# Patient Record
Sex: FEMALE | Race: BLACK OR AFRICAN AMERICAN | NOT HISPANIC OR LATINO | URBAN - METROPOLITAN AREA
[De-identification: names, ages, dates, MRNs, and addresses within clinical notes are randomized per-mention and may not be internally consistent; named-entity substitution may affect disease eponyms.]

---

## 2018-12-17 ENCOUNTER — WALK IN (OUTPATIENT)
Dept: URGENT CARE | Age: 28
End: 2018-12-17

## 2018-12-17 DIAGNOSIS — Z11.1 SCREENING-PULMONARY TB: Primary | ICD-10-CM

## 2018-12-17 PROCEDURE — 86580 TB INTRADERMAL TEST: CPT | Performed by: REGISTERED NURSE

## 2018-12-19 ENCOUNTER — WALK IN (OUTPATIENT)
Dept: URGENT CARE | Age: 28
End: 2018-12-19

## 2018-12-19 DIAGNOSIS — Z11.1 PPD SCREENING TEST: Primary | ICD-10-CM

## 2018-12-19 LAB
INDURATION: 0 MM (ref 0–?)
SKIN TEST RESULT: NORMAL

## 2018-12-19 PROCEDURE — X1094 NO CHARGE VISIT: HCPCS | Performed by: NURSE PRACTITIONER

## 2019-04-05 ENCOUNTER — WALK IN (OUTPATIENT)
Dept: URGENT CARE | Age: 29
End: 2019-04-05

## 2019-04-05 DIAGNOSIS — Z11.1 SCREENING-PULMONARY TB: Primary | ICD-10-CM

## 2019-04-05 PROCEDURE — 86580 TB INTRADERMAL TEST: CPT | Performed by: NURSE PRACTITIONER

## 2019-04-07 ENCOUNTER — WALK IN (OUTPATIENT)
Dept: URGENT CARE | Age: 29
End: 2019-04-07

## 2019-04-07 DIAGNOSIS — Z11.1 ENCOUNTER FOR PPD SKIN TEST READING: Primary | ICD-10-CM

## 2019-04-07 LAB
INDURATION: 0 MM (ref 0–?)
SKIN TEST RESULT: NEGATIVE

## 2019-04-07 PROCEDURE — X1094 NO CHARGE VISIT: HCPCS | Performed by: NURSE PRACTITIONER

## 2019-04-12 ENCOUNTER — WALK IN (OUTPATIENT)
Dept: URGENT CARE | Age: 29
End: 2019-04-12

## 2019-04-12 DIAGNOSIS — Z11.1 SCREENING-PULMONARY TB: Primary | ICD-10-CM

## 2019-04-12 PROCEDURE — 86580 TB INTRADERMAL TEST: CPT | Performed by: NURSE PRACTITIONER

## 2019-04-14 ENCOUNTER — WALK IN (OUTPATIENT)
Dept: URGENT CARE | Age: 29
End: 2019-04-14

## 2019-04-14 DIAGNOSIS — Z11.1 ENCOUNTER FOR PPD SKIN TEST READING: Primary | ICD-10-CM

## 2019-04-14 LAB
INDURATION: 0 MM (ref 0–?)
SKIN TEST RESULT: NEGATIVE

## 2019-04-14 PROCEDURE — X1094 NO CHARGE VISIT: HCPCS | Performed by: NURSE PRACTITIONER

## 2020-02-14 ENCOUNTER — WORKER'S COMP (OUTPATIENT)
Dept: OCCUPATIONAL MEDICINE | Age: 30
End: 2020-02-14

## 2020-02-14 DIAGNOSIS — Z02.71 ISSUE OF MEDICAL CERTIFICATE FOR DISABILITY EXAMINATION: ICD-10-CM

## 2020-02-14 DIAGNOSIS — S39.012A LUMBOSACRAL STRAIN, INITIAL ENCOUNTER: Primary | ICD-10-CM

## 2020-02-14 PROCEDURE — 99204 OFFICE O/P NEW MOD 45 MIN: CPT | Performed by: FAMILY MEDICINE

## 2020-02-19 ENCOUNTER — WORKER'S COMP (OUTPATIENT)
Dept: OCCUPATIONAL MEDICINE | Age: 30
End: 2020-02-19

## 2020-02-19 DIAGNOSIS — Z02.71 ISSUE OF MEDICAL CERTIFICATE FOR DISABILITY EXAMINATION: ICD-10-CM

## 2020-02-19 DIAGNOSIS — S39.012D LUMBOSACRAL STRAIN, SUBSEQUENT ENCOUNTER: Primary | ICD-10-CM

## 2020-02-19 PROCEDURE — 99214 OFFICE O/P EST MOD 30 MIN: CPT | Performed by: FAMILY MEDICINE

## 2020-02-19 ASSESSMENT — PAIN SCALES - GENERAL: PAINLEVEL: 7-8

## 2020-02-27 ENCOUNTER — TELEPHONE (OUTPATIENT)
Dept: OCCUPATIONAL MEDICINE | Age: 30
End: 2020-02-27

## 2020-03-04 ENCOUNTER — WORKER'S COMP (OUTPATIENT)
Dept: OCCUPATIONAL MEDICINE | Age: 30
End: 2020-03-04

## 2020-03-04 DIAGNOSIS — S39.012D LUMBOSACRAL STRAIN, SUBSEQUENT ENCOUNTER: Primary | ICD-10-CM

## 2020-03-04 DIAGNOSIS — Z02.71 ISSUE OF MEDICAL CERTIFICATE FOR DISABILITY EXAMINATION: ICD-10-CM

## 2020-03-04 PROCEDURE — 99214 OFFICE O/P EST MOD 30 MIN: CPT | Performed by: FAMILY MEDICINE

## 2020-03-04 ASSESSMENT — PAIN SCALES - GENERAL: PAINLEVEL: 0

## 2020-03-17 ENCOUNTER — APPOINTMENT (OUTPATIENT)
Dept: PHYSICAL THERAPY | Age: 30
End: 2020-03-17
Attending: FAMILY MEDICINE

## 2020-03-18 ENCOUNTER — APPOINTMENT (OUTPATIENT)
Dept: OCCUPATIONAL MEDICINE | Age: 30
End: 2020-03-18

## 2020-03-25 ENCOUNTER — APPOINTMENT (OUTPATIENT)
Dept: OCCUPATIONAL MEDICINE | Age: 30
End: 2020-03-25

## 2020-05-15 ENCOUNTER — WORKER'S COMP (OUTPATIENT)
Dept: OCCUPATIONAL MEDICINE | Age: 30
End: 2020-05-15

## 2020-05-15 DIAGNOSIS — S39.012D LUMBOSACRAL STRAIN, SUBSEQUENT ENCOUNTER: ICD-10-CM

## 2020-05-15 DIAGNOSIS — Z02.71 ISSUE OF MEDICAL CERTIFICATE FOR DISABILITY EXAMINATION: Primary | ICD-10-CM

## 2020-05-15 PROCEDURE — 99441 TELEPHONE E&M BY PHYSICIAN EST PT NOT ORIG PREV 7 DAYS 5-10 MIN: CPT | Performed by: FAMILY MEDICINE

## 2020-08-12 ENCOUNTER — OFFICE VISIT (OUTPATIENT)
Dept: OTHER | Facility: HOSPITAL | Age: 30
End: 2020-08-12
Attending: PREVENTIVE MEDICINE

## 2020-08-12 DIAGNOSIS — Z01.84 IMMUNITY STATUS TESTING: ICD-10-CM

## 2020-08-12 DIAGNOSIS — Z11.1 SCREENING-PULMONARY TB: Primary | ICD-10-CM

## 2020-08-12 PROCEDURE — 86787 VARICELLA-ZOSTER ANTIBODY: CPT

## 2020-08-12 PROCEDURE — 86480 TB TEST CELL IMMUN MEASURE: CPT

## 2020-08-13 LAB
M TB IFN-G CD4+ T-CELLS BLD-ACNC: 0.06 IU/ML
M TB TUBERC IFN-G BLD QL: NEGATIVE
M TB TUBERC IGNF/MITOGEN IGNF CONTROL: 5.98 IU/ML
QUANTIFERON TB1 MINUS NIL: 0 IU/ML
QUANTIFERON TB2 MINUS NIL: 0 IU/ML

## 2020-08-14 LAB — VZV IGG SER IA-ACNC: >4000 (ref 165–?)

## 2021-01-01 ENCOUNTER — TELEPHONE (OUTPATIENT)
Dept: INTERNAL MEDICINE CLINIC | Facility: HOSPITAL | Age: 31
End: 2021-01-01

## 2021-01-01 DIAGNOSIS — Z20.822 SUSPECTED COVID-19 VIRUS INFECTION: Primary | ICD-10-CM

## 2021-01-01 NOTE — TELEPHONE ENCOUNTER
Department: CTU 3                                 [x] [address] 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     HAVE []   No [x]    If yes, who: Do you share a workspace? Yes []   No [x]       If yes, with whom? Do you have any family members sick at home?      [] Yes    [x] No   If yes, explain:       NOTES: States her symptoms began with a headache back on 12/23/20 an

## 2021-01-02 ENCOUNTER — LAB ENCOUNTER (OUTPATIENT)
Dept: LAB | Age: 31
End: 2021-01-02
Attending: PREVENTIVE MEDICINE

## 2021-01-02 DIAGNOSIS — Z20.822 SUSPECTED COVID-19 VIRUS INFECTION: ICD-10-CM

## 2021-01-03 LAB — SARS-COV-2 RNA RESP QL NAA+PROBE: NOT DETECTED

## 2021-04-07 ENCOUNTER — LAB ENCOUNTER (OUTPATIENT)
Dept: LAB | Age: 31
End: 2021-04-07
Attending: PREVENTIVE MEDICINE

## 2021-04-07 ENCOUNTER — TELEPHONE (OUTPATIENT)
Dept: INTERNAL MEDICINE CLINIC | Facility: HOSPITAL | Age: 31
End: 2021-04-07

## 2021-04-07 DIAGNOSIS — Z20.822 SUSPECTED COVID-19 VIRUS INFECTION: Primary | ICD-10-CM

## 2021-04-07 DIAGNOSIS — Z20.822 SUSPECTED COVID-19 VIRUS INFECTION: ICD-10-CM

## 2021-04-07 NOTE — TELEPHONE ENCOUNTER
Results and RTW guidelines:    COVID RESULT GIVEN:      Test type:    [x] Rapid         [] Alinity      [x] NEGATIVE     Ordered Alinity retest?  []Yes   [x] No    Notes: Will see PCP for evaluation and Tx.     RTW PLAN:    [] RTW 10 days with clearance []       • Fatigue                        Yes []       • Body Aches                Yes []        • Chills                           Yes []        • Headache                   Yes []             • GI symptoms             Yes []     No [x] hotline. [] Asymptomatic: Test w/ Alinity now; repeat w/ Alinity in 5-7 days. Ok to work, monitor for symptoms     [] High-risk exposure:    [] Symptomatic: Test w/ Rapid now; repeat w/ Alinity in 1-2 days if persistent symptoms or high suspicion.   Home

## 2021-05-25 VITALS
TEMPERATURE: 98.3 F | TEMPERATURE: 98.9 F | HEART RATE: 98 BPM | DIASTOLIC BLOOD PRESSURE: 79 MMHG | SYSTOLIC BLOOD PRESSURE: 123 MMHG | HEART RATE: 83 BPM | DIASTOLIC BLOOD PRESSURE: 81 MMHG | SYSTOLIC BLOOD PRESSURE: 115 MMHG

## 2021-11-02 ENCOUNTER — LAB ENCOUNTER (OUTPATIENT)
Dept: LAB | Facility: HOSPITAL | Age: 31
End: 2021-11-02
Attending: PREVENTIVE MEDICINE

## 2021-11-02 ENCOUNTER — TELEPHONE (OUTPATIENT)
Dept: INTERNAL MEDICINE CLINIC | Facility: HOSPITAL | Age: 31
End: 2021-11-02

## 2021-11-02 DIAGNOSIS — Z20.822 SUSPECTED COVID-19 VIRUS INFECTION: Primary | ICD-10-CM

## 2021-11-02 DIAGNOSIS — Z20.822 SUSPECTED COVID-19 VIRUS INFECTION: ICD-10-CM

## 2021-11-02 NOTE — TELEPHONE ENCOUNTER
Department: CTU 3         [x] [facility]  []MISTI   [] [address] [place] [address]    Dept Manager/Supervisor/team or clinical lead: Britty Brunner  Position:  [] MD     [] RN     [] Respiratory Therapist     [x] PCT     [] PSR      [] Other     HAVE YOU RECEIVED THE COVID-19 Vaccine?  Enedina Marinelli your unit while not wearing a mask? (e.g., during meal breaks):  Yes []   No [x]    If yes, who:   Do you share a workspace? Yes []   No [x]       If yes, with whom? Do you have any family members sick at home?      [] Yes    [x] No   If yes, explain: taken:    11/2/2021  []  Outside testing       [x] Manager notified    INSTRUCTIONS PROVIDED:    [x] Employee will schedule testing via [employer] or call Central Scheduling at [phone].   Instructed to remain in their vehicle when arrive at site; call Reg

## 2021-11-03 ENCOUNTER — LAB ENCOUNTER (OUTPATIENT)
Dept: LAB | Facility: HOSPITAL | Age: 31
End: 2021-11-03
Attending: PREVENTIVE MEDICINE

## 2021-11-03 DIAGNOSIS — Z20.822 SUSPECTED COVID-19 VIRUS INFECTION: ICD-10-CM

## 2021-11-03 NOTE — TELEPHONE ENCOUNTER
Results and RTW guidelines:    COVID RESULT:    [] Viewed by employee in [address] RTW plan and instructions as indicated on triage call. Manager notified. Estimated RTW date:   [x] Discussed with employee   [] Unable to reach by phone.   Sent via [employer] and if symptoms worsen                - If outside testing completed, bring a copy of result to RTW appointment      Notes:   Rapid negative. Still with congestion and loss of taste today. Went to work. Will get Alinity today.     RTW PLAN:    [] RTW 10 day

## 2021-12-23 ENCOUNTER — TELEPHONE (OUTPATIENT)
Dept: INTERNAL MEDICINE CLINIC | Facility: HOSPITAL | Age: 31
End: 2021-12-23

## 2021-12-23 ENCOUNTER — LAB ENCOUNTER (OUTPATIENT)
Dept: LAB | Age: 31
End: 2021-12-23
Attending: PREVENTIVE MEDICINE

## 2021-12-23 DIAGNOSIS — Z20.822 EXPOSURE TO CONFIRMED CASE OF COVID-19: ICD-10-CM

## 2021-12-23 DIAGNOSIS — Z20.822 EXPOSURE TO CONFIRMED CASE OF COVID-19: Primary | ICD-10-CM

## 2021-12-23 NOTE — TELEPHONE ENCOUNTER
Outside Covid Testing done    Results and RTW guidelines:    COVID RESULT reported:      Test type:    [x] Rapid outside         [] PCR outside    Date of test: 12/22/2021     Test location: [facility]         [] Result viewed in Epic with verbal co hospitalization for COVID    4. Within 20 days of intubation for COVID    5.  Still has a fever, vomiting or diarrhea   - Keep communication open with management about RTW and if symptoms worsen     Notes:     RTW PLAN:    [] RTW 10 days with clearance from Yes [x] 100.0;  Taking medication          • Cough                          Yes [x]      • Shortness of breath  Yes []      • Congestion                 Yes []      • Runny nose                Yes []        • Loss of Smell              Yes []       •  L

## 2021-12-25 NOTE — TELEPHONE ENCOUNTER
----- Message from Leo Grove MD sent at 12/25/2021  8:50 AM CST -----  Covid - DETECTED results to covid center    Results and RTW guidelines:    COVID RESULT:    [] Viewed by employee in [address]   RTW plan and instructions as indicated on t activities    2. Is still feeling \"unwell\"    3. Within 15 days of hospitalization for COVID    4. Within 20 days of intubation for COVID    5.  Still has a fever, vomiting or diarrhea   - Keep communication open with management about RTW and if symptoms

## 2022-06-04 ENCOUNTER — HOSPITAL ENCOUNTER (EMERGENCY)
Facility: HOSPITAL | Age: 32
Discharge: HOME | End: 2022-06-04
Attending: EMERGENCY MEDICINE
Payer: MEDICAID

## 2022-06-04 VITALS — HEART RATE: 95 BPM | TEMPERATURE: 99 F | WEIGHT: 240 LBS | OXYGEN SATURATION: 100 % | RESPIRATION RATE: 16 BRPM

## 2022-06-04 DIAGNOSIS — S29.012A STRAIN OF THORACIC SPINE: Primary | ICD-10-CM

## 2022-06-04 LAB
ATRIAL RATE: 88 BPM
BILIRUB UR QL STRIP.AUTO: NEGATIVE
COLOR UR AUTO: YELLOW
GLUCOSE UR STRIP.AUTO-MCNC: NEGATIVE MG/DL
KETONES UR STRIP.AUTO-MCNC: NEGATIVE MG/DL
NITRITE UR QL STRIP.AUTO: NEGATIVE
P AXIS: 70 DEGREES
P-R INTERVAL: 140 MS
PH UR STRIP.AUTO: 6 [PH] (ref 5–8)
Q-T INTERVAL: 340 MS
QRS DURATION: 80 MS
QTC CALCULATION (BEZET): 411 MS
R AXIS: 66 DEGREES
RBC UR QL AUTO: NEGATIVE
SP GR UR STRIP.AUTO: >=1.03 (ref 1–1.03)
T AXIS: 39 DEGREES
UROBILINOGEN UR STRIP.AUTO-MCNC: 0.2 MG/DL
VENTRICULAR RATE: 88 BPM

## 2022-06-04 PROCEDURE — 93010 ELECTROCARDIOGRAM REPORT: CPT

## 2022-06-04 PROCEDURE — 81015 MICROSCOPIC EXAM OF URINE: CPT | Performed by: EMERGENCY MEDICINE

## 2022-06-04 PROCEDURE — 81001 URINALYSIS AUTO W/SCOPE: CPT | Performed by: EMERGENCY MEDICINE

## 2022-06-04 PROCEDURE — 87086 URINE CULTURE/COLONY COUNT: CPT | Performed by: EMERGENCY MEDICINE

## 2022-06-04 PROCEDURE — 93005 ELECTROCARDIOGRAM TRACING: CPT

## 2022-06-04 PROCEDURE — 99284 EMERGENCY DEPT VISIT MOD MDM: CPT

## 2022-06-04 RX ORDER — CYCLOBENZAPRINE HCL 10 MG
10 TABLET ORAL ONCE
Status: COMPLETED | OUTPATIENT
Start: 2022-06-04 | End: 2022-06-04

## 2022-06-04 NOTE — ED INITIAL ASSESSMENT (HPI)
Back pain for a few days without any known injury. Patient denies GI or  issues. Motrin gave minimal relief.

## 2022-06-04 NOTE — CM/SW NOTE
Called in flexeril 10mg TID PRN, total of 15 tabs. Confirmed this with Dr. Jd Tony. Called patient to inform her of this as well.

## 2023-12-16 ENCOUNTER — APPOINTMENT (OUTPATIENT)
Dept: GENERAL RADIOLOGY | Facility: HOSPITAL | Age: 33
End: 2023-12-16
Attending: EMERGENCY MEDICINE
Payer: OTHER MISCELLANEOUS

## 2023-12-16 ENCOUNTER — HOSPITAL ENCOUNTER (EMERGENCY)
Facility: HOSPITAL | Age: 33
Discharge: HOME | End: 2023-12-16
Attending: EMERGENCY MEDICINE
Payer: OTHER MISCELLANEOUS

## 2023-12-16 VITALS
DIASTOLIC BLOOD PRESSURE: 63 MMHG | OXYGEN SATURATION: 100 % | RESPIRATION RATE: 16 BRPM | HEIGHT: 66 IN | BODY MASS INDEX: 37.77 KG/M2 | WEIGHT: 235 LBS | HEART RATE: 71 BPM | SYSTOLIC BLOOD PRESSURE: 119 MMHG | TEMPERATURE: 98 F
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
## 2023-12-16 DIAGNOSIS — S20.221A CONTUSION OF RIGHT SIDE OF BACK, INITIAL ENCOUNTER: ICD-10-CM

## 2023-12-16 DIAGNOSIS — S09.90XA CLOSED HEAD INJURY, INITIAL ENCOUNTER: Primary | ICD-10-CM

## 2023-12-16 DIAGNOSIS — S93.401A SPRAIN OF RIGHT ANKLE, UNSPECIFIED LIGAMENT, INITIAL ENCOUNTER: ICD-10-CM

## 2023-12-16 PROCEDURE — 96374 THER/PROPH/DIAG INJ IV PUSH: CPT

## 2023-12-16 PROCEDURE — 99284 EMERGENCY DEPT VISIT MOD MDM: CPT

## 2023-12-16 PROCEDURE — 72170 X-RAY EXAM OF PELVIS: CPT | Performed by: EMERGENCY MEDICINE

## 2023-12-16 PROCEDURE — 96375 TX/PRO/DX INJ NEW DRUG ADDON: CPT

## 2023-12-16 PROCEDURE — 73610 X-RAY EXAM OF ANKLE: CPT | Performed by: EMERGENCY MEDICINE

## 2023-12-16 PROCEDURE — 72100 X-RAY EXAM L-S SPINE 2/3 VWS: CPT | Performed by: EMERGENCY MEDICINE

## 2023-12-16 RX ORDER — KETOROLAC TROMETHAMINE 30 MG/ML
15 INJECTION, SOLUTION INTRAMUSCULAR; INTRAVENOUS ONCE
Status: COMPLETED | OUTPATIENT
Start: 2023-12-16 | End: 2023-12-16

## 2023-12-16 RX ORDER — TIZANIDINE 4 MG/1
4 TABLET ORAL EVERY 6 HOURS PRN
Qty: 30 TABLET | Refills: 0 | Status: SHIPPED | OUTPATIENT
Start: 2023-12-16

## 2023-12-16 RX ORDER — HYDROCODONE BITARTRATE AND ACETAMINOPHEN 5; 325 MG/1; MG/1
1-2 TABLET ORAL EVERY 6 HOURS PRN
Qty: 10 TABLET | Refills: 0 | Status: SHIPPED | OUTPATIENT
Start: 2023-12-16 | End: 2023-12-21

## 2023-12-16 RX ORDER — HYDROMORPHONE HYDROCHLORIDE 1 MG/ML
0.5 INJECTION, SOLUTION INTRAMUSCULAR; INTRAVENOUS; SUBCUTANEOUS EVERY 30 MIN PRN
Status: DISCONTINUED | OUTPATIENT
Start: 2023-12-16 | End: 2023-12-16

## 2023-12-16 RX ORDER — ONDANSETRON 2 MG/ML
4 INJECTION INTRAMUSCULAR; INTRAVENOUS ONCE
Status: COMPLETED | OUTPATIENT
Start: 2023-12-16 | End: 2023-12-16

## 2023-12-16 NOTE — ED INITIAL ASSESSMENT (HPI)
PT TO THE ED WITH C/O FALL. PT WAS TAKING A TRAY TO A ROOM WHEN SHE SLIPPED ON WATER ON THE FLOOR. PT STATES SHE HIT THE RIGHT SIDE OF HER HEAD, C/O LOWER BACK PAIN AND RIGHT ANKLE PAIN.  PT DENIES LOC AND BLOOD THINNERS

## 2023-12-17 NOTE — DISCHARGE INSTRUCTIONS
[address] [place] [address]  Dept: [phone]  Dept Fax: [phone]     Occupational restrictions  For: Alice Kamran    No work until reassessed by [employer] on Monday. You may take ibuprofen 600 to 800 mg every 6 hours as needed for pain. Take with food. Right leg weightbearing as tolerated with crutches. Ice intermittently, 20 minutes on and 20 minutes off.

## 2023-12-19 ENCOUNTER — APPOINTMENT (OUTPATIENT)
Dept: OTHER | Facility: HOSPITAL | Age: 33
End: 2023-12-19
Attending: PREVENTIVE MEDICINE

## 2023-12-23 ENCOUNTER — OFFICE VISIT (OUTPATIENT)
Dept: OCCUPATIONAL MEDICINE | Age: 33
End: 2023-12-23
Attending: FAMILY MEDICINE
Payer: OTHER MISCELLANEOUS

## 2023-12-23 ENCOUNTER — HOSPITAL ENCOUNTER (OUTPATIENT)
Dept: CT IMAGING | Age: 33
Discharge: HOME | End: 2023-12-23
Attending: FAMILY MEDICINE
Payer: OTHER MISCELLANEOUS

## 2023-12-23 DIAGNOSIS — S09.90XA HEAD TRAUMA: ICD-10-CM

## 2023-12-23 DIAGNOSIS — S09.90XA HEAD TRAUMA: Primary | ICD-10-CM

## 2023-12-23 PROCEDURE — 70450 CT HEAD/BRAIN W/O DYE: CPT | Performed by: FAMILY MEDICINE

## 2023-12-28 ENCOUNTER — HOSPITAL ENCOUNTER (OUTPATIENT)
Dept: GENERAL RADIOLOGY | Facility: HOSPITAL | Age: 33
Discharge: HOME | End: 2023-12-28
Attending: PREVENTIVE MEDICINE

## 2023-12-28 ENCOUNTER — OFFICE VISIT (OUTPATIENT)
Dept: OTHER | Facility: HOSPITAL | Age: 33
End: 2023-12-28
Attending: PREVENTIVE MEDICINE

## 2023-12-28 DIAGNOSIS — S83.91XA RIGHT KNEE SPRAIN: ICD-10-CM

## 2023-12-28 DIAGNOSIS — S83.91XA RIGHT KNEE SPRAIN: Primary | ICD-10-CM

## 2023-12-28 PROCEDURE — 73562 X-RAY EXAM OF KNEE 3: CPT | Performed by: PREVENTIVE MEDICINE

## 2024-01-02 ENCOUNTER — APPOINTMENT (OUTPATIENT)
Dept: OTHER | Facility: HOSPITAL | Age: 34
End: 2024-01-02
Attending: PREVENTIVE MEDICINE

## 2024-01-05 ENCOUNTER — TELEPHONE (OUTPATIENT)
Dept: ORTHOPEDICS CLINIC | Facility: CLINIC | Age: 34
End: 2024-01-05

## 2024-01-05 NOTE — TELEPHONE ENCOUNTER
Patient scheduled with Dr. Luna for a right knee work injury. Imaging in epic.    Future Appointments   Date Time Provider Department Center   1/12/2024 11:30 AM Foster Luna,  [facility]   1/16/2024 12:00 PM Keerthi Riley MD [facility]

## 2024-01-12 ENCOUNTER — OFFICE VISIT (OUTPATIENT)
Dept: ORTHOPEDICS CLINIC | Facility: CLINIC | Age: 34
End: 2024-01-12
Payer: OTHER MISCELLANEOUS

## 2024-01-12 VITALS — WEIGHT: 235 LBS | BODY MASS INDEX: 37.77 KG/M2 | HEIGHT: 66 IN

## 2024-01-12 DIAGNOSIS — M23.91 DERANGEMENT OF RIGHT KNEE: Primary | ICD-10-CM

## 2024-01-12 DIAGNOSIS — S83.206A POSITIVE MCMURRAY TEST OF RIGHT KNEE, INITIAL ENCOUNTER: ICD-10-CM

## 2024-01-12 PROCEDURE — 99204 OFFICE O/P NEW MOD 45 MIN: CPT | Performed by: FAMILY MEDICINE

## 2024-01-12 PROCEDURE — 3008F BODY MASS INDEX DOCD: CPT | Performed by: FAMILY MEDICINE

## 2024-01-12 RX ORDER — DIAZEPAM 5 MG/1
5 TABLET ORAL
Qty: 2 TABLET | Refills: 0 | Status: SHIPPED | OUTPATIENT
Start: 2024-01-12 | End: 2024-01-12

## 2024-01-12 RX ORDER — DICLOFENAC SODIUM 75 MG/1
75 TABLET, DELAYED RELEASE ORAL 2 TIMES DAILY
Qty: 28 TABLET | Refills: 1 | Status: SHIPPED | OUTPATIENT
Start: 2024-01-12

## 2024-01-12 NOTE — H&P
Sports Medicine Clinic Note     Subjective:    Chief Complaint: Right knee pain.    History of Present Illness: This is a pleasant 33 year old female who presents with pain in the right knee for the last approx 1 month. 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 The incident was followed by immediate pain and swelling. Reports difficulty bearing weight on the affected leg but this has improved over the past several weeks, still with some discomfort on weightbearing but no longer needing assistive devices. Denies any history of prior knee injuries or surgeries. No reported numbness, tingling, or other neurological symptoms. The patient does not report mechanical symptoms such as locking or catching of the affected knee.    Objective:    Right Knee Examination:    Inspection:  Antalgic gait  Valgus alignment  No appreciable muscle atrophy  No warmth, erythema, or effusion    Palpation:  Tenderness over the medial joint line, lateral joint line, patella, with patellofemoral compression  No tenderness over the tibial tubercle, suprapatellar pouch, pes anserine bursa, popliteal fossa, distal IT band, or other compartments of the leg  Flexion from 0-135  Extensor mechanism intact and without palpable defects  No audible crepitus    Neurovascular:  SILT S / S / SP / DP / Tib nerve distributions  Fires quad / hamstrings / GSC / TA / EHL  2+ DP & PT pulses, brisk cap refill    Special:  Normal patellar tracking  No laxity/opening to varus/valgus force at both 30 and 0 degrees  Negative Lachman  Negative Posterior Drawer  Positive Chantell    Imaging:    Radiographs of the knee were personally reviewed in the office and revealed no apparent fracture, dislocation, or significant joint space narrowing. Patellar lateralization observed on sunrise view but overall bony architecture appears normal.    Assessment:    Suspected  Internal Derangement of Right Knee  Differential Diagnosis: Ligamentous injury, Meniscal tear, Chondral injury    Plan:    Imaging: Order MRI of the knee to evaluate chondral surfaces, ligamentous structures, and rule out internal derangement.  Medication: Prescribe Diclofenac for pain management, can be combined with Acetaminophen as needed for pain control.  Activity Recommendations: Activity is advised to be limited to tolerance, avoiding excessive walking or standing for prolonged periods. Advise the patient to avoid activities that exacerbate pain.  Bracing: Consider knee brace for stability and support during activities.    Follow-up: Tentative follow-up is planned following MRI to reassess the treatment plan and adapt as necessary. Instruct the patient to return earlier if symptoms worsen or new symptoms develop.    Foster Luna DO, TWINM   Primary Care Sports Medicine    Department of Orthopaedic Surgery  [address] [address]   [address] [place] [address]    t: [phone]  f: [phone]      [email or web address]

## 2024-01-17 ENCOUNTER — TELEPHONE (OUTPATIENT)
Dept: ORTHOPEDICS CLINIC | Facility: CLINIC | Age: 34
End: 2024-01-17

## 2024-01-17 NOTE — TELEPHONE ENCOUNTER
Spoke with pt., advised can send an order to an outside facility but they would need to obtain the authorization.  Asked pt to get CD of images to bring to her follow up visit.  Pt will let us know which facility to fax order to.

## 2024-01-17 NOTE — TELEPHONE ENCOUNTER
Pt is having is difficulty getting MRI at [facility] through work comp. Pt wants to know next steps, should she get it at [facility] or [facility]?

## 2024-01-17 NOTE — TELEPHONE ENCOUNTER
Understood yes work comp usually dictates their preference/what they'll pay for. I am ok with both but both of those places sometimes are slow with faxing over the radiology read and ensuring the patient leaves with a disc so as long as both of those things get done I have no problem with either place. Thanks!

## 2024-01-26 ENCOUNTER — OFFICE VISIT (OUTPATIENT)
Dept: ORTHOPEDICS CLINIC | Facility: CLINIC | Age: 34
End: 2024-01-26
Payer: OTHER MISCELLANEOUS

## 2024-01-26 DIAGNOSIS — M94.261 CHONDROMALACIA, KNEE, RIGHT: Primary | ICD-10-CM

## 2024-01-26 DIAGNOSIS — E88.89 HOFFA'S FAT PAD DISEASE (HCC): ICD-10-CM

## 2024-01-28 RX ORDER — DICLOFENAC SODIUM 75 MG/1
75 TABLET, DELAYED RELEASE ORAL 2 TIMES DAILY
Qty: 28 TABLET | Refills: 1 | Status: SHIPPED | OUTPATIENT
Start: 2024-01-28

## 2024-01-29 NOTE — PROGRESS NOTES
Sports Medicine Clinic Note     Subjective:    Chief Complaint: Right knee MRI review.    Interval History: 33-year-old female returns for a follow-up visit regarding her right knee pain. Since the last visit, the patient reports a gradual improvement in pain and mobility. She mentions adhering to the activity limitations and medication regimen as prescribed. The patient notes occasional discomfort with prolonged standing or walking but no new incidents of trauma or injury. Denies any development of locking, catching, or new neurological symptoms in the affected knee. Reports completion of the ordered MRI.    Objective:    Right Knee Examination:    Inspection:  Improved gait, less antalgic compared to the previous visit  Stable valgus alignment  No muscle atrophy  No signs of warmth, erythema, or effusion    Palpation:  Mild tenderness persists over the patella, especially with patellofemoral compression  Decreased tenderness along the medial and lateral joint lines  Flexion and extension movements show improvement, less discomfort noted  No new palpable abnormalities    Neurovascular:  Continues to demonstrate SILT S/S/SP/DP/Tib nerve distributions  Good muscle activation in the quadriceps, hamstrings, gastrocnemius-soleus complex, tibialis anterior, and extensor hallucis longus  Maintains 2+ DP & PT pulses, brisk cap refill    Imaging:    MRI Review: The MRI of the right knee demonstrates chondromalacia patella and Hoffa's fat pad impingement. There is no evidence of ligamentous or meniscal injury.    Assessment:    Chondromalacia Patella  Hoffa's Fat Pad Impingement    Plan:    Physical Therapy: Referral to physical therapy for a tailored rehabilitation program focusing on strengthening and stabilizing the knee, with specific attention to patellar alignment and reducing impingement of Hoffa's fat pad.  Medication: Continue Diclofenac and Acetaminophen as needed for pain control. Monitor for any side  effects.  Activity Modification: Encourage the patient to continue limiting activities that exacerbate knee pain. Gradual reintroduction of activities as tolerated under the guidance of physical therapy.  Bracing: Continue using the knee brace as needed for support, especially during activities that may strain the knee.    Follow-Up: Schedule a follow-up in 4-6 weeks to monitor progress and adjust the treatment plan as needed. If symptoms worsen or new symptoms develop, the patient is advised to return sooner.    Foster Luna DO, CAM   Primary Care Sports Medicine    Department of Orthopaedic Surgery  [address] [address]   [address] [place] [address]    t: [phone]  f: [phone]      [facility]

## 2024-02-02 NOTE — PROGRESS NOTES
LOWER EXTREMITY EVALUATION:     Diagnosis:   Chondromalacia, knee, right (M94.261)       Referring Provider: Jeremy  Date of Evaluation:    2/2/2024    Precautions:  None Next MD visit:   none scheduled  Date of Surgery: n/a     PATIENT SUMMARY   Julienne Levy is a 33 year old female who presents to therapy today with complaints of pain on the R knee, does hurt from time to time when she is doing too much of the squatting, \"it feels weird\". Patient slipped and fell while at work, she twisted her R knee and sprained her R ankle. Patient takes arthritis tylenol. Denies any pain on the R ankle. No swelling, no N/T on LE  Pt describes pain level current 0/10, at best 0/10, at worst 6/10.   Current functional limitations include repeated squatting, and she has been apprehensive with stair activities.     Julienne describes prior level of function independent with no difficulty. Pt goals include the R knee to feel better, and not be limited by it.  Past medical history was reviewed with Julienne. Significant findings include : unremarkable    ASSESSMENT  Julienne presents to physical therapy evaluation with primary c/o pain on the R knee, does hurt from time to time when she is doing too much of the squatting, \"it feels weird\". . The results of the objective tests and measures show strength deficits on R quads, impaired R quads eccentric control, impaired patellar tracking.  Functional deficits include but are not limited to squatting, stair activities.  Signs and symptoms are consistent with diagnosis of Chondromalacia, knee, right (M94.261)    . Pt and PT discussed evaluation findings, pathology, POC and HEP.  Pt voiced understanding and performs HEP correctly without reported pain. Skilled Physical Therapy is medically necessary to address the above impairments and reach functional goals.     OBJECTIVE:   Observation: Bilateral genu valgus, R patella laterally deviated  Palpation: Grade 1 tenderness on the R patellar  tendon, infrapatellar pad; crepitus on R patellar region with knee flexion and compression, hypermobility of R patella  Sensation: Intact    AROM: (* denotes performed with pain)  Hip Knee Foot/Ankle   WNL    R - apprehensive with resistance Flexion: R-130*; L- 130  Extension: R -130; -130    R - WNL, L -WNL         Flexibility:  Hip Flexor: R Mild restrictions , L WNL  Hamstrings: R Mild restrictions ; L Mild restrictions   Piriformis: R WNL; L WNL  Quads: R Mild restrictions ; L WNL  Gastroc-soleus: R Mild restrictions ; L Mild restrictions     Strength/MMT: (* denotes performed with pain)  Hip Knee Foot/Ankle   Flexion: R 4/5; L 5/5  Extension: R 4-/5; L 5/5  Abduction: R 4/5; L 5/5  ER: R 4/5; L 5/5  IR: R 4/5; L 5/5  R - apprehensive with resistance Flexion: R 5/5; L 5/5  Extension: R 4/5; L 5/5    DF: R 5/5; L 5/5  PF: R 5/5; L 5/5  INV: R 5/5; L 5/5  EV: R 5/5; L 5/5  Great toe ext: R 5/5; L 5/5     Special tests:   Varus test-negative  Valgus test - negative  Apley's test -negative  Patellar compression test - positive     Gait: pt ambulates on level ground with normal mechanics  Squatting: symmetric, but noted increased crepitus with activity  Stairs: ascending stairs- smooth and easy, descending stairs- noted immediate step off of R LE when leading with the L LE  Balance: SLS: R 10- mild instability apprehensive no LOB sec, L 10 sec - stable  5STST-12 secs-     Today’s Treatment and Response:   Pt education was provided on exam findings, treatment diagnosis, treatment plan, expectations, and prognosis. Pt was also provided recommendations for activity modifications, possible soreness after evaluation, modalities as needed [ice/heat], postural corrections, pain science education , detrimental fear avoidance behaviors, and importance of remaining active.  Patient was instructed in and issued a HEP for: Access Code: [email or web address]  URL: [email or web address]  Date: 02/05/2024  Prepared by: China  Ramirez    Exercises  - Gastroc Stretch on Wall  - 1-2 x daily - 7 x weekly - 1 sets - 3 reps - 30 hold  - Clamshell  - 1-2 x daily - 7 x weekly - 1-2 sets - 10 reps  - Mini Squat  - 1 x daily - 7 x weekly - 1 sets - 10 reps  - Supine Transversus Abdominis Bracing - Hands on Stomach  - 2 x daily - 7 x weekly - 1-2 sets - 10 reps - 5 hold    Charges: PT Eval Moderate Complexity,      Total Timed Treatment: 40 min     Total Treatment Time: 40 min     Based on clinical rationale and outcome measures, this evaluation involved Moderate Complexity decision making due to 1-2 personal factors/comorbidities, 3 body structures involved/activity limitations, and evolving symptoms including changing pain levels.  PLAN OF CARE:    Goals: (to be met in 8 visits)  Pt will improve eccentric quad control to normal to descend 1 flight of stairs reciprocally and with ease, no immediate step off with the R  Pt will demonstrate increased hip ER/ABD strength to 5/5 to perform stepping and squatting activities without excessive femoral IR/ADD   Pt will improve SLS to 10s to improve safety with gait on uneven surfaces such as grass and gravel  Patient will demonstrate improved strength of R quad leading to improved patellar tracking and decrease R knee pain to 0/10  Patient will be able to perform repeated activities including squatting, prolonged ambulation, stair activities with no pain with work and home activities  Patient's perceived disability to decrease to 0, functional mobility will improved as evidenced by increased LEFS 80/80, no disability  Pt will be independent and compliant with comprehensive HEP to maintain progress achieved in PT     Frequency / Duration: Patient will be seen for 2 x/week or a total of 8 visits over a 90 day period. Treatment will include: Manual Therapy, Neuromuscular Re-education, Therapeutic Exercise, Home Exercise Program instruction, and Modalities to include: Electrical stimulation (unattended) and  Ultrasound    Education or treatment limitation: None  Rehab Potential:excellent    LEFS Score  75/80= 93.75% function    Patient/Family/Caregiver was advised of these findings, precautions, and treatment options and has agreed to actively participate in planning and for this course of care.    Thank you for your referral. Please co-sign or sign and return this letter via fax as soon as possible to [phone]. If you have any questions, please contact me at Dept: [phone]    Sincerely,  Electronically signed by therapist: China Ramirez PT  Physician's certification required: Yes  I certify the need for these services furnished under this plan of treatment and while under my care.    X___________________________________________________ Date____________________    Certification From: 2/2/2024  To:5/2/2024

## 2024-02-05 ENCOUNTER — OFFICE VISIT (OUTPATIENT)
Dept: PHYSICAL THERAPY | Age: 34
End: 2024-02-05
Attending: FAMILY MEDICINE
Payer: OTHER MISCELLANEOUS

## 2024-02-05 DIAGNOSIS — M94.261 CHONDROMALACIA, KNEE, RIGHT: Primary | ICD-10-CM

## 2024-02-05 PROCEDURE — 97162 PT EVAL MOD COMPLEX 30 MIN: CPT | Performed by: PHYSICAL THERAPIST

## 2024-02-06 NOTE — PROGRESS NOTES
Diagnosis:   Chondromalacia, knee, right (M94.261)          Referring Provider: Jeremy  Date of Evaluation:    2/2/2024    Precautions:  None Next MD visit:   none scheduled  Date of Surgery: n/a   Insurance Primary/Secondary: WORKERS COMP / N/A     # Auth Visits:   16          Subjective: She started doing her exercises, no pain currently.    Pain: 0/10      Objective: Hypermobility of R patella, difficulty with TKE and squatting exercises. Weakness of the R quadriceps, decreased eccentric control, crepitus on the R knee with squats.      Assessment: Patient tolerated treatment well. Weakness of the R quads, decreased quads eccentric control .  PT provided verbal, tactile cues for proper performance of exercises, and assessed response to interventions. Pain with step downs for eccentric quad strength, and TKE, was discontinued. Discussed importance of each intervention to maximize treatment effect.        Goals:   Goals: (to be met in 8 visits)  Pt will improve eccentric quad control to normal to descend 1 flight of stairs reciprocally and with ease, no immediate step off with the R  Pt will demonstrate increased hip ER/ABD strength to 5/5 to perform stepping and squatting activities without excessive femoral IR/ADD   Pt will improve SLS to 10s to improve safety with gait on uneven surfaces such as grass and gravel  Patient will demonstrate improved strength of R quad leading to improved patellar tracking and decrease R knee pain to 0/10  Patient will be able to perform repeated activities including squatting, prolonged ambulation, stair activities with no pain with work and home activities  Patient's perceived disability to decrease to 0, functional mobility will improved as evidenced by increased LEFS 80/80, no disability  Pt will be independent and compliant with comprehensive HEP to maintain progress achieved in PT     Plan: Continue PT as per established POC  Date: 2/7/2024  TX#: 2/8 Date:                 TX#:  3/ Date:                 TX#: 4/ Date:                 TX#: 5/ Date:   Tx#: 6/   Nu step, L3, 5 mins       Calf stretches 2x 30 secs, standing hamstrings on chair 2x 30 secs       HR/TR x 20       TB hip 4 way x 10 each red TB       TKE red TB - attempted - increased pain       Quad sets x 10 with 5 secs hold       TA brace with IA abd/add x 10 with 5  secs       Hamstrings curls with SB x 20       Shuttle nathen squats ER and N , 25#       Mini squats x 20- taped the knee for stability, V type         Anterior step ups x 20   Lateral step ups x 20        HEP:  Access Code: [email or web address]  URL: [email or web address]  Date: 02/05/2024  Prepared by: China Ramirez     Exercises  - Gastroc Stretch on Wall  - 1-2 x daily - 7 x weekly - 1 sets - 3 reps - 30 hold  - Clamshell  - 1-2 x daily - 7 x weekly - 1-2 sets - 10 reps  - Mini Squat  - 1 x daily - 7 x weekly - 1 sets - 10 reps  - Supine Transversus Abdominis Bracing - Hands on Stomach  - 2 x daily - 7 x weekly - 1-2 sets - 10 reps - 5 hold    Charges: Thera ex -45 mins       Total Timed Treatment: 45 min  Total Treatment Time: 45 min

## 2024-02-07 ENCOUNTER — OFFICE VISIT (OUTPATIENT)
Dept: PHYSICAL THERAPY | Age: 34
End: 2024-02-07
Attending: FAMILY MEDICINE
Payer: OTHER MISCELLANEOUS

## 2024-02-07 PROCEDURE — 97110 THERAPEUTIC EXERCISES: CPT | Performed by: PHYSICAL THERAPIST

## 2024-02-13 ENCOUNTER — OFFICE VISIT (OUTPATIENT)
Dept: PHYSICAL THERAPY | Age: 34
End: 2024-02-13
Attending: FAMILY MEDICINE
Payer: OTHER MISCELLANEOUS

## 2024-02-13 PROCEDURE — 97110 THERAPEUTIC EXERCISES: CPT | Performed by: PHYSICAL THERAPIST

## 2024-02-13 NOTE — PROGRESS NOTES
Diagnosis:   Chondromalacia, knee, right (M94.261)          Referring Provider: Jeremy  Date of Evaluation:    2/2/2024    Precautions:  None Next MD visit:   none scheduled  Date of Surgery: n/a   Insurance Primary/Secondary: [employer] COMP / N/A     # Auth Visits:   16          Subjective: Patient reports that she started doing her home exercises and she has no pain, just pressure on the R knee.    Pain: 0/10      Objective: Hypermobility of R patella. Weakness of the R quadriceps, decreased eccentric control, crepitus on the R knee with squatting activities.       Assessment: Patient is responding well to PT treatment, reports of decreased pain.  PT provided verbal, tactile cues for proper performance of exercises, and assessed response to interventions. Taped the R knee to decreased R patellar tendon stress. Increased intensity and volume of exercises to increase R quads strength and stability. Discussed importance of each intervention to maximize treatment effect.        Goals:   Goals: (to be met in 8 visits)  Pt will improve eccentric quad control to normal to descend 1 flight of stairs reciprocally and with ease, no immediate step off with the R  Pt will demonstrate increased hip ER/ABD strength to 5/5 to perform stepping and squatting activities without excessive femoral IR/ADD   Pt will improve SLS to 10s to improve safety with gait on uneven surfaces such as grass and gravel  Patient will demonstrate improved strength of R quad leading to improved patellar tracking and decrease R knee pain to 0/10  Patient will be able to perform repeated activities including squatting, prolonged ambulation, stair activities with no pain with work and home activities  Patient's perceived disability to decrease to 0, functional mobility will improved as evidenced by increased LEFS 80/80, no disability  Pt will be independent and compliant with comprehensive HEP to maintain progress achieved in PT     Plan: Continue PT as per  established POC  Date: 2/7/2024  TX#: 2/8 Date:         2/13/2024        TX#: 3/8 Date:                 TX#: 4/ Date:                 TX#: 5/ Date:   Tx#: 6/   Nu step, L3, 5 mins Nu step, L3, 5 mins      Calf stretches 2x 30 secs, standing hamstrings on chair 2x 30 secs Calf stretches 2x 30 secs, standing hamstrings on chair 2x 30 secs  V type tape for decrease patellar tendon stress  HR/TR x 20  TB hip 4 way x 20 each red TB  Quad sets x 10 with 5 secs hold  Hamstrings curls with SB x 20  TA brace with bridge x 20  Mini squats x 20  Anterior step ups x 20 8\" step  Lateral step up x 4 8\"  Lateral step ups x 20 6\"  Provided blue TB      HR/TR x 20       TB hip 4 way x 10 each red TB       TKE red TB - attempted - increased pain       Quad sets x 10 with 5 secs hold       TA brace with PA abd/add x 10 with 5  secs       Hamstrings curls with SB x 20       Shuttle nathen squats ER and N , 25# x 30       Mini squats x 20- taped the knee for stability, V type         Anterior step ups x 20   Lateral step ups x 20        HEP:  Access Code: [email or web address]  URL: [email or web address]  Date: 02/05/2024  Prepared by: China Ramirez     Exercises  - Gastroc Stretch on Wall  - 1-2 x daily - 7 x weekly - 1 sets - 3 reps - 30 hold  - Clamshell  - 1-2 x daily - 7 x weekly - 1-2 sets - 10 reps  - Mini Squat  - 1 x daily - 7 x weekly - 1 sets - 10 reps  - Supine Transversus Abdominis Bracing - Hands on Stomach  - 2 x daily - 7 x weekly - 1-2 sets - 10 reps - 5 hold      Charges: Thera ex -45 mins       Total Timed Treatment: 45 min  Total Treatment Time: 45 min

## 2024-02-16 ENCOUNTER — OFFICE VISIT (OUTPATIENT)
Dept: PHYSICAL THERAPY | Age: 34
End: 2024-02-16
Attending: FAMILY MEDICINE
Payer: OTHER MISCELLANEOUS

## 2024-02-16 PROCEDURE — 97110 THERAPEUTIC EXERCISES: CPT | Performed by: PHYSICAL THERAPIST

## 2024-02-16 NOTE — PROGRESS NOTES
Diagnosis:   Chondromalacia, knee, right (M94.261)          Referring Provider: Jeremy  Date of Evaluation:    2/2/2024    Precautions:  None Next MD visit:   none scheduled  Date of Surgery: n/a   Insurance Primary/Secondary: [employer] COMP / N/A     # Auth Visits:   16          Subjective: Patient reports that the pain on the R knee is better, and the weird feeling also decreased.     Pain: 0/10      Objective: AROM R - 130 now no pain, no tenderness on peripatellar region ad R patellar tendon.Hpermobility R patella.    Assessment: Taping on the R knee not done today to determine patient's response to exercises.   Added proprioceptive and core exercises, based on PT's clinical judgement. PT provided verbal, tactile cues for proper performance of exercises, and assessed response to interventions. No pain with exercises, but has crepitus with mini squats and step ups, now no pain with lateral step ups. Discussed importance of each intervention to maximize treatment effect.        Goals:   Goals: (to be met in 8 visits)  Pt will improve eccentric quad control to normal to descend 1 flight of stairs reciprocally and with ease, no immediate step off with the R  Pt will demonstrate increased hip ER/ABD strength to 5/5 to perform stepping and squatting activities without excessive femoral IR/ADD   Pt will improve SLS to 10s to improve safety with gait on uneven surfaces such as grass and gravel  Patient will demonstrate improved strength of R quad leading to improved patellar tracking and decrease R knee pain to 0/10  Patient will be able to perform repeated activities including squatting, prolonged ambulation, stair activities with no pain with work and home activities  Patient's perceived disability to decrease to 0, functional mobility will improved as evidenced by increased LEFS 80/80, no disability  Pt will be independent and compliant with comprehensive HEP to maintain progress achieved in PT     Plan: Continue PT as  per established POC  Date: 2/7/2024  TX#: 2/8 Date:         2/13/2024        TX#: 3/8 Date:    2/16/2024             TX#: 4/8 Date:                 TX#: 5/ Date:   Tx#: 6/   Nu step, L3, 5 mins Nu step, L3, 5 mins TM 1.5 mph, 5 mins     Calf stretches 2x 30 secs, standing hamstrings on chair 2x 30 secs Calf stretches 2x 30 secs, standing hamstrings on chair 2x 30 secs  V type tape for decrease patellar tendon stress  HR/TR x 20  TB hip 4 way x 20 each red TB  Quad sets x 10 with 5 secs hold  Hamstrings curls with SB x 20  TA brace with bridge x 20  Mini squats x 20  Anterior step ups x 20 8\" step  Lateral step up x 4 8\"  Lateral step ups x 20 6\"  Provided blue TB Calf stretches 2x 30 secs, standing hamstrings on chair 2x 30 secs  HR/TR x 20  SLS R/L 30 secs each  TB hip 4 way x 20 each red TB    Hamstrings curls with SB x 20  TA brace with bridge x 20  TA brace with abd/ add with NV, push down x 10 with 5secs hold  Mini squats x 20  Anterior step ups x 20 8\" step  Lateral step ups x 20 8\"  Provided blue TB  Shuttle 50 #, x 30 N and ER  Balance board 2 mins each way A/P, M/L     HR/TR x 20       TB hip 4 way x 10 each red TB       TKE red TB - attempted - increased pain       Quad sets x 10 with 5 secs hold       TA brace with NV abd/add x 10 with 5  secs       Hamstrings curls with SB x 20       Shuttle nathen squats ER and N , 25# x 30       Mini squats x 20- taped the knee for stability, V type         Anterior step ups x 20   Lateral step ups x 20        HEP:  Access Code: [email or web address]  URL: [email or web address]  Date: 02/05/2024  Prepared by: China Ramirez     Exercises  - Gastroc Stretch on Wall  - 1-2 x daily - 7 x weekly - 1 sets - 3 reps - 30 hold  - Clamshell  - 1-2 x daily - 7 x weekly - 1-2 sets - 10 reps  - Mini Squat  - 1 x daily - 7 x weekly - 1 sets - 10 reps  - Supine Transversus Abdominis Bracing - Hands on Stomach  - 2 x daily - 7 x weekly - 1-2 sets - 10 reps - 5 hold      Charges: Thera ex -43  mins       Total Timed Treatment: 43 min  Total Treatment Time: 43 min

## 2024-02-19 ENCOUNTER — OFFICE VISIT (OUTPATIENT)
Dept: PHYSICAL THERAPY | Age: 34
End: 2024-02-19
Attending: FAMILY MEDICINE
Payer: OTHER MISCELLANEOUS

## 2024-02-19 PROCEDURE — 97110 THERAPEUTIC EXERCISES: CPT | Performed by: PHYSICAL THERAPIST

## 2024-02-19 NOTE — PROGRESS NOTES
Diagnosis:   Chondromalacia, knee, right (M94.261)          Referring Provider: Jeremy  Date of Evaluation:    2/2/2024    Precautions:  None Next MD visit:   none scheduled  Date of Surgery: n/a   Insurance Primary/Secondary: [employer] COMP / N/A     # Auth Visits:   16          Subjective: Patient reports that she is just sore, no pain or weird sensations on the R knee. Stairs is getting easier.    Pain: 0/10      Objective: Hypermobility R patella. Fatigue on R quads.     Assessment: Increased volume and intensity of exercises to promote increased strength, improve patellar stability. Added exercises on compliant surfaces to improve kinesthetic and proprioceptive sense. Instructed and demonstrated new exercises. PT provided verbal, tactile cues for proper performance of exercises, and assessed response to interventions. Discussed importance of each intervention to maximize treatment effect.        Goals:   Goals: (to be met in 8 visits)  Pt will improve eccentric quad control to normal to descend 1 flight of stairs reciprocally and with ease, no immediate step off with the R  Pt will demonstrate increased hip ER/ABD strength to 5/5 to perform stepping and squatting activities without excessive femoral IR/ADD   Pt will improve SLS to 10s to improve safety with gait on uneven surfaces such as grass and gravel  Patient will demonstrate improved strength of R quad leading to improved patellar tracking and decrease R knee pain to 0/10  Patient will be able to perform repeated activities including squatting, prolonged ambulation, stair activities with no pain with work and home activities  Patient's perceived disability to decrease to 0, functional mobility will improved as evidenced by increased LEFS 80/80, no disability  Pt will be independent and compliant with comprehensive HEP to maintain progress achieved in PT     Plan: Continue PT as per established POC  Date: 2/7/2024  TX#: 2/8 Date:         2/13/2024         TX#: 3/8 Date:    2/16/2024             TX#: 4/8 Date:   2/19/2024              TX#: 5/8 Date:   Tx#: 6/   Nu step, L3, 5 mins Nu step, L3, 5 mins TM 1.5 mph, 5 mins TM 1.5 mph, 5 mins    Calf stretches 2x 30 secs, standing hamstrings on chair 2x 30 secs Calf stretches 2x 30 secs, standing hamstrings on chair 2x 30 secs  V type tape for decrease patellar tendon stress  HR/TR x 20  TB hip 4 way x 20 each red TB  Quad sets x 10 with 5 secs hold  Hamstrings curls with SB x 20  TA brace with bridge x 20  Mini squats x 20  Anterior step ups x 20 8\" step  Lateral step up x 4 8\"  Lateral step ups x 20 6\"  Provided blue TB Calf stretches 2x 30 secs, standing hamstrings on chair 2x 30 secs  HR/TR x 20  SLS R/L 30 secs each  TB hip 4 way x 20 each red TB    Hamstrings curls with SB x 20  TA brace with bridge x 20  TA brace with abd/ add with WA, push down x 10 with 5secs hold  Mini squats x 20  Anterior step ups x 20 8\" step  Lateral step ups x 20 8\"  Provided blue TB  Shuttle 50 #, x 30 N and ER  Balance board 2 mins each way A/P, M/L halfway stretches 2x 30 secs, standing hamstrings on chair 2x 30 secs  HR/TR x 20  SLS R/L 30 secs each on dynadisc  TB hip 4 way x 20 each green TB  Monster walk anterior 1 lap, and lateral  2 laps over 20 feet distance  Hamstrings curls with SB x 20  TA brace with bridge x 20  TA brace with abd/ add with WA, push down x 10 with 5secs hold  Anterior step ups x 20 on BOSU  Shuttle 50 #, x 30 N and ER  Balance board 2 mins each way A/P, M/L    HR/TR x 20       TB hip 4 way x 10 each red TB       TKE red TB - attempted - increased pain       Quad sets x 10 with 5 secs hold       TA brace with WA abd/add x 10 with 5  secs       Hamstrings curls with SB x 20       Shuttle nathen squats ER and N , 25# x 30       Mini squats x 20- taped the knee for stability, V type         Anterior step ups x 20   Lateral step ups x 20        HEP:  Access Code: [email or web address]  URL: [email or web address]  Date:  02/05/2024  Prepared by: China Ramirez     Exercises  - Gastroc Stretch on Wall  - 1-2 x daily - 7 x weekly - 1 sets - 3 reps - 30 hold  - Clamshell  - 1-2 x daily - 7 x weekly - 1-2 sets - 10 reps  - Mini Squat  - 1 x daily - 7 x weekly - 1 sets - 10 reps  - Supine Transversus Abdominis Bracing - Hands on Stomach  - 2 x daily - 7 x weekly - 1-2 sets - 10 reps - 5 hold      Charges: Thera ex -45 mins       Total Timed Treatment: 45 min  Total Treatment Time: 45 min

## 2024-02-21 ENCOUNTER — OFFICE VISIT (OUTPATIENT)
Dept: PHYSICAL THERAPY | Age: 34
End: 2024-02-21
Attending: FAMILY MEDICINE
Payer: OTHER MISCELLANEOUS

## 2024-02-21 PROCEDURE — 97110 THERAPEUTIC EXERCISES: CPT | Performed by: PHYSICAL THERAPIST

## 2024-02-21 NOTE — PROGRESS NOTES
Diagnosis:   Chondromalacia, knee, right (M94.261)          Referring Provider: Jeremy  Date of Evaluation:    2/2/2024    Precautions:  None Next MD visit:   none scheduled  Date of Surgery: n/a   Insurance Primary/Secondary: WORK[employer] COMP / N/A     # Auth Visits:   16          Subjective: Had soreness on B thighs yesterday, but no pain, no weird sensations on the R knee.     Pain: 0/10      Objective: Decreased crepitus on the R knee.  (-) patellar compression test, no tenderness on patellar tendon. Normal patellar mobility.    Assessment: Resumed standing TKE, now no pain. PT provided minimal verbal, tactile cues for proper performance of exercises, and assessed response to interventions. Added exercises on compliant surfaces to improve patient's ability to adapt on uneven terrain. No onset of pain with exercises.Discussed importance of each intervention to maximize treatment effect.        Goals:   Goals: (to be met in 8 visits)  Pt will improve eccentric quad control to normal to descend 1 flight of stairs reciprocally and with ease, no immediate step off with the R  Pt will demonstrate increased hip ER/ABD strength to 5/5 to perform stepping and squatting activities without excessive femoral IR/ADD   Pt will improve SLS to 10s to improve safety with gait on uneven surfaces such as grass and gravel  Patient will demonstrate improved strength of R quad leading to improved patellar tracking and decrease R knee pain to 0/10  Patient will be able to perform repeated activities including squatting, prolonged ambulation, stair activities with no pain with work and home activities  Patient's perceived disability to decrease to 0, functional mobility will improved as evidenced by increased LEFS 80/80, no disability  Pt will be independent and compliant with comprehensive HEP to maintain progress achieved in PT     Plan: Continue PT as per established POC  Date: 2/21/2024  Tx#: 6/8 Date:   Tx#: /8 Date:   Tx#: /8 Date:    2/19/2024              Tx#: /8 Date:   Tx#: /8   TM 1.5 mph, 5 mins       Calf stretches 2x 30 secs, standing hamstrings on chair 2x 30 secs  HR/TR x 20  SLS R/L 30 secs each on dynadisc  TB hip 4 way x 20 each green TB  Standing TKE - green x 20  Monster walk anterior 1 lap, and lateral  1 lap over 20 feet distance  Hamstrings curls with SB x 20  TA brace with bridge x 20  TA brace with abd/ add with [place], push down x 10 with 5secs hold  Anterior and lateral step ups x 20 on [employer]  Shuttle 50 #, x 30 N and ER  Balance board 2 mins each way A/P, M/L              HEP:  Access Code: [email or web address]  URL: [email or web address]  Date: 02/05/2024  Prepared by: China Ramirez     Exercises  - Gastroc Stretch on Wall  - 1-2 x daily - 7 x weekly - 1 sets - 3 reps - 30 hold  - Clamshell  - 1-2 x daily - 7 x weekly - 1-2 sets - 10 reps  - Mini Squat  - 1 x daily - 7 x weekly - 1 sets - 10 reps  - Supine Transversus Abdominis Bracing - Hands on Stomach  - 2 x daily - 7 x weekly - 1-2 sets - 10 reps - 5 hold      Charges: Thera ex -45 mins       Total Timed Treatment: 45 min  Total Treatment Time: 45 min

## 2024-02-26 ENCOUNTER — OFFICE VISIT (OUTPATIENT)
Dept: PHYSICAL THERAPY | Age: 34
End: 2024-02-26
Attending: FAMILY MEDICINE
Payer: OTHER MISCELLANEOUS

## 2024-02-26 PROCEDURE — 97110 THERAPEUTIC EXERCISES: CPT | Performed by: PHYSICAL THERAPIST

## 2024-02-26 NOTE — PROGRESS NOTES
Diagnosis:   Chondromalacia, knee, right (M94.261)          Referring Provider: Jeremy  Date of Evaluation:    2/2/2024    Precautions:  None Next MD visit:   none scheduled  Date of Surgery: n/a   Insurance Primary/Secondary: WORKERS COMP / N/A     # Auth Visits:   16          Subjective: R knee continues to feel better, no soreness after last PT session.    Pain: 0/10      Objective: Decreased crepitus on the R knee.  (-) patellar compression test, no tenderness on patellar tendon. Normal patellar mobility.    Assessment: Patient continues to progress and demonstrates improved tolerance to exercises, with improved patellar tracking and decreased onset of pain with activities. PT provided minimal verbal, tactile cues for proper performance of exercises, and assessed response to interventions. iscussed importance of each intervention to maximize treatment effect.        Goals:   Goals: (to be met in 8 visits)  Pt will improve eccentric quad control to normal to descend 1 flight of stairs reciprocally and with ease, no immediate step off with the R  Pt will demonstrate increased hip ER/ABD strength to 5/5 to perform stepping and squatting activities without excessive femoral IR/ADD   Pt will improve SLS to 10s to improve safety with gait on uneven surfaces such as grass and gravel  Patient will demonstrate improved strength of R quad leading to improved patellar tracking and decrease R knee pain to 0/10  Patient will be able to perform repeated activities including squatting, prolonged ambulation, stair activities with no pain with work and home activities  Patient's perceived disability to decrease to 0, functional mobility will improved as evidenced by increased LEFS 80/80, no disability  Pt will be independent and compliant with comprehensive HEP to maintain progress achieved in PT     Plan: Continue PT as per established POC  Date: 2/21/2024  Tx#: 6/8 Date: 2/26/2024  Tx#: 7/8 Date:   Tx#: /8 Date:   2/19/2024               Tx#: /8 Date:   Tx#: /8   TM 1.5 mph, 5 mins Elliptical x 5 mins      Calf stretches 2x 30 secs, standing hamstrings on chair 2x 30 secs  HR/TR x 20  SLS R/L 30 secs each on dynadisc  TB hip 4 way x 20 each green TB  Standing TKE - green x 20  Monster walk anterior 1 lap, and lateral  1 lap over 20 feet distance  Hamstrings curls with SB x 20  TA brace with bridge x 20  TA brace with abd/ add with TN, push down x 10 with 5secs hold  Anterior and lateral step ups x 20 on BOSU  Shuttle 50 #, x 30 N and ER  Balance board 2 mins each way A/P, M/L Calf stretches 2x 30 secs, standing hamstrings on chair 2x 30 secs  HR/TR x 20 with 5# DB   SLS R/L 30 secs each on dynadisc  Standing TKE - green x 20  Monster walk anterior 1 lap, and lateral  1 lap over 20 feet distance  Hamstrings curls with SB x 20  TA brace with bridge x 20  TA brace with abd/ add with TN, push down x 10 with 5secs hold  Anterior and lateral step ups x 20 on BOSU  Shuttle 70 #, x 30 N and ER  Balance board 2 mins each way A/P, M/L             HEP:  Access Code: [email or web address]  URL: [email or web address]  Date: 02/05/2024  Prepared by: China Ramirez     Exercises  - Gastroc Stretch on Wall  - 1-2 x daily - 7 x weekly - 1 sets - 3 reps - 30 hold  - Clamshell  - 1-2 x daily - 7 x weekly - 1-2 sets - 10 reps  - Mini Squat  - 1 x daily - 7 x weekly - 1 sets - 10 reps  - Supine Transversus Abdominis Bracing - Hands on Stomach  - 2 x daily - 7 x weekly - 1-2 sets - 10 reps - 5 hold      Charges: Thera ex -45 mins       Total Timed Treatment: 45 min  Total Treatment Time: 45 min

## 2024-02-28 ENCOUNTER — OFFICE VISIT (OUTPATIENT)
Dept: PHYSICAL THERAPY | Age: 34
End: 2024-02-28
Attending: FAMILY MEDICINE
Payer: OTHER MISCELLANEOUS

## 2024-02-28 PROCEDURE — 97110 THERAPEUTIC EXERCISES: CPT | Performed by: PHYSICAL THERAPIST

## 2024-02-28 NOTE — PROGRESS NOTES
Diagnosis:   Chondromalacia, knee, right (M94.261)          Referring Provider: Jeremy  Date of Evaluation:    2/2/2024    Precautions:  None Next MD visit:   none scheduled  Date of Surgery: n/a   Insurance Primary/Secondary: WORK[employer] COMP / N/A     # Auth Visits:   16          Subjective: No pain on the R knee, no soreness after exercises. She has her period today so, so she feels cramping pain, not feeling too great.    Pain: 0/10      Objective: Decreased crepitus on the R knee.  (-) patellar compression test, no tenderness on patellar tendon. Normal patellar mobility.    Assessment: Patient was able to perform and tolerate LE strengthening exercises, added new exercises including eccentric quad strengthening with 6\" step stool and was challenging, noted R knee instability after 5 reps, had to stop every after 5 reps, only able to perform 15 reps before onset of fatigue. Performed single leg squats no onset of pain. Supine exercises not done today due to abdominal cramping.    Goals:   Goals: (to be met in 8 visits)  Pt will improve eccentric quad control to normal to descend 1 flight of stairs reciprocally and with ease, no immediate step off with the R- MET  Pt will demonstrate increased hip ER/ABD strength to 5/5 to perform stepping and squatting activities without excessive femoral IR/ADD   Pt will improve SLS to 10s to improve safety with gait on uneven surfaces such as grass and gravel - MET  Patient will demonstrate improved strength of R quad leading to improved patellar tracking and decrease R knee pain to 0/10  Patient will be able to perform repeated activities including squatting, prolonged ambulation, stair activities with no pain with work and home activities  Patient's perceived disability to decrease to 0, functional mobility will improved as evidenced by increased LEFS 80/80, no disability  Pt will be independent and compliant with comprehensive HEP to maintain progress achieved in PT     Plan:  Continue PT as per established POC  Date: 2/21/2024  Tx#: 6/8 Date: 2/26/2024  Tx#: 7/8 Date: 2/28/2024    Tx#: 8/8 Date:           Tx#: /8 Date:   Tx#: /8   TM 1.5 mph, 5 mins Elliptical x 5 mins Elliptical x 5 mins     Calf stretches 2x 30 secs, standing hamstrings on chair 2x 30 secs  HR/TR x 20  SLS R/L 30 secs each on dynadisc  TB hip 4 way x 20 each green TB  Standing TKE - green x 20  Monster walk anterior 1 lap, and lateral  1 lap over 20 feet distance  Hamstrings curls with SB x 20  TA brace with bridge x 20  TA brace with abd/ add with WA, push down x 10 with 5secs hold  Anterior and lateral step ups x 20 on BOSU  Shuttle 50 #, x 30 N and ER  Balance board 2 mins each way A/P, M/L Calf stretches 2x 30 secs, standing hamstrings on chair 2x 30 secs  HR/TR x 20 with 5# DB   SLS R/L 30 secs each on dynadisc  Standing TKE - green x 20  Monster walk anterior 1 lap, and lateral  1 lap over 20 feet distance  Hamstrings curls with SB x 20  TA brace with bridge x 20  TA brace with abd/ add with WA, push down x 10 with 5secs hold  Anterior and lateral step ups x 20 on BOSU  Shuttle 70 #, x 30 N and ER  Balance board 2 mins each way A/P, M/L Calf stretches 2x 30 secs, standing hamstrings on chair 2x 30 secs  HR/TR x 20 with 5# DB   SLS R/L 30 secs each on dynadisc  Standing TKE - blue  x 20  Monster walk anterior 1 lap, and lateral  1 lap over 20 feet distance  Anterior and lateral step ups x 20 on BOSU  Shuttle  nathen squats, 50 #, x 30 N and ER  Single leg squats, 25# x 30  BAPS board x 20  each way A/P, M/L            HEP:  Access Code: [email or web address]  URL: [email or web address]  Date: 02/05/2024  Prepared by: China Ramirez     Exercises  - Gastroc Stretch on Wall  - 1-2 x daily - 7 x weekly - 1 sets - 3 reps - 30 hold  - Clamshell  - 1-2 x daily - 7 x weekly - 1-2 sets - 10 reps  - Mini Squat  - 1 x daily - 7 x weekly - 1 sets - 10 reps  - Supine Transversus Abdominis Bracing - Hands on Stomach  - 2 x daily - 7 x  weekly - 1-2 sets - 10 reps - 5 hold      Charges: Thera ex -40 mins       Total Timed Treatment: 40 min  Total Treatment Time: 40 min

## 2024-03-04 ENCOUNTER — OFFICE VISIT (OUTPATIENT)
Dept: PHYSICAL THERAPY | Age: 34
End: 2024-03-04
Attending: FAMILY MEDICINE
Payer: OTHER MISCELLANEOUS

## 2024-03-04 PROCEDURE — 97110 THERAPEUTIC EXERCISES: CPT | Performed by: PHYSICAL THERAPIST

## 2024-03-04 NOTE — PROGRESS NOTES
Diagnosis:   Chondromalacia, knee, right (M94.261)          Referring Provider: Jeremy  Date of Evaluation:    2/2/2024    Precautions:  None Next MD visit:   none scheduled  Date of Surgery: n/a   Insurance Primary/Secondary: WORK[employer] COMP / N/A     # Auth Visits:   [id number]         Discharge Summary  Pt has attended 9 visits in Physical Therapy.    Subjective:  3/4/2024  Patient reports that she is now back to where she was prior to the injury, she feels 100% better, she has been comfortable doing her ADLs and household activities. She has the \"weird\" feeling with initially going down but it went away denies pain. She was able to jump on the trampoline and do a slow run on the treadmill.  Pain: 0/10    2/2/2024  Julienne Levy is a 33 year old female who presents to therapy today with complaints of pain on the R knee, does hurt from time to time when she is doing too much of the squatting, \"it feels weird\". Patient slipped and fell while at work, she twisted her R knee and sprained her R ankle. Patient takes arthritis tylenol. Denies any pain on the R ankle. No swelling, no N/T on LE  Pt describes pain level current 0/10, at best 0/10, at worst 6/10.   Current functional limitations include repeated squatting, and she has been apprehensive with stair activities.         Objective:   Observation: Bilateral mild genu valgus.  Palpation: Now no Grade 1 tenderness on the R patellar tendon, infrapatellar pad;  mild crepitus on R patellar region with knee flexion and compression, normal mobility and tracking of R patella  Sensation: Intact     AROM: (* denotes performed with pain)  Hip Knee Foot/Ankle   WNL   Flexion: R-133; L- 130  Extension: R -0; -0    R - WNL, L -WNL            Flexibility:  Hip Flexor: R WNL , L WNL  Hamstrings: R WNL ; L Mild restrictions   Piriformis: R WNL; L WNL  Quads: R WNL ; L WNL  Gastroc-soleus: R WNL ; L WNL     Strength/MMT: (* denotes performed with pain)  Hip Knee   Flexion: R 5/5; L  5/5  Extension: R 5/5; L 5/5  Abduction: R 5/5; L 5/5  ER: R 4/5; L 5/5  IR: R 4/5; L 5/5  R - now no apprehension with resistance Flexion: R 5/5; L 5/5  Extension: R 5/5; L 5/5     Eccentric control R quads - good      Special tests:   Varus test-negative  Valgus test - negative  Apley's test -negative  Patellar compression test - negative      Gait: pt ambulates on level ground with normal mechanics  Squatting: symmetric, still with crepitus but is mild  Stairs: ascending stairs- smooth and easy, descending stairs- smooth and easy  Balance: SLS: R 30- stable, with ease, sec, L 10 sec - stable  5STST-12 secs- no limitation    Assessment: Patient progressed well with PT treatment, now has WNL ROM on the R knee and WNL strength. Patient was able to perform exercises in compliant and non -compliant surfaces with no instability, no onset of R knee pain. Patient was able to tolerate high volume and intensity of exercises with no onset of R knee pain. Patient demonstrated improved R knee stability and control. Patient met all goals established during IE and is now discharged on HEP. Discussed importance of regular compliance with HEP, patient understood.     Goals:   Goals: (to be met in 8 visits)  Pt will improve eccentric quad control to normal to descend 1 flight of stairs reciprocally and with ease, no immediate step off with the R- MET  Pt will demonstrate increased hip ER/ABD strength to 5/5 to perform stepping and squatting activities without excessive femoral IR/ADD -MET  Pt will improve SLS to 10s to improve safety with gait on uneven surfaces such as grass and gravel - MET  Patient will demonstrate improved strength of R quad leading to improved patellar tracking and decrease R knee pain to 0/10-MET  Patient will be able to perform repeated activities including squatting, prolonged ambulation, stair activities with no pain with work and home activities-MET  Patient's perceived disability to decrease to 0,  functional mobility will improved as evidenced by increased LEFS 80/80, no disability-MET  Pt will be independent and compliant with comprehensive HEP to maintain progress achieved in PT -MET    Plan: Continue PT as per established POC  Date: 2/21/2024  Tx#: 6/8 Date: 2/26/2024  Tx#: 7/8 Date: 2/28/2024  Tx#: 8/8 Date: 3/4/2024          Tx#: 9/9 Date:   Tx#: /8   TM 1.5 mph, 5 mins Elliptical x 5 mins Elliptical x 5 mins Elliptical x 5 mins    Calf stretches 2x 30 secs, standing hamstrings on chair 2x 30 secs  HR/TR x 20  SLS R/L 30 secs each on dynadisc  TB hip 4 way x 20 each green TB  Standing TKE - green x 20  Monster walk anterior 1 lap, and lateral  1 lap over 20 feet distance  Hamstrings curls with SB x 20  TA brace with bridge x 20  TA brace with abd/ add with CO, push down x 10 with 5secs hold  Anterior and lateral step ups x 20 on BOSU  Shuttle 50 #, x 30 N and ER  Balance board 2 mins each way A/P, M/L Calf stretches 2x 30 secs, standing hamstrings on chair 2x 30 secs  HR/TR x 20 with 5# DB   SLS R/L 30 secs each on dynadisc  Standing TKE - green x 20  Monster walk anterior 1 lap, and lateral  1 lap over 20 feet distance  Hamstrings curls with SB x 20  TA brace with bridge x 20  TA brace with abd/ add with CO, push down x 10 with 5secs hold  Anterior and lateral step ups x 20 on BOSU  Shuttle 70 #, x 30 N and ER  Balance board 2 mins each way A/P, M/L Calf stretches 2x 30 secs, standing hamstrings on chair 2x 30 secs  HR/TR x 20 with 5# DB   SLS R/L 30 secs each on dynadisc  Standing TKE - blue  x 20  Monster walk anterior 1 lap, and lateral  1 lap over 20 feet distance  Anterior and lateral step ups x 20 on BOSU  Shuttle  nathen squats, 50 #, x 30 N and ER  Single leg squats, 25# x 30  BAPS board x 20  each way A/P, M/L Calf stretches 2x 30 secs, standing hamstrings on chair 2x 30 secs  SLS R/L 30 secs each on floor, dynadisc  Hip 4 way x 20 green TB  Quads dip (eccentric) x 20 each, 6\" step  Monster walk  anterior 1 lap, and lateral  1 lap over 30 feet distance  Shuttle  nathen squats, 50 #, x 20 N and ER    Balance board x 2 mins  each way A/P, M/L  Bridge x 20  SB hamstrings curls x 20           HEP:  Access Code: [email or web address]  URL: [email or web address]  Date: 03/04/2024  Prepared by: China Ramirez    Exercises  - Gastroc Stretch on Wall  - 1-2 x daily - 7 x weekly - 1 sets - 3 reps - 30 hold  - Clamshell  - 1-2 x daily - 7 x weekly - 1-2 sets - 10 reps  - Supine Transversus Abdominis Bracing - Hands on Stomach  - 2 x daily - 7 x weekly - 1-2 sets - 10 reps - 5 hold  - Standing Heel Raise  - 2 x daily - 7 x weekly - 1-2 sets - 10 reps  - Standing Toe Raises at Chair  - 2 x daily - 7 x weekly - 1-2 sets - 10 reps  - Single Leg Stance  - 2 x daily - 7 x weekly - 1-2 sets - 3 reps - 30 hold  - Standing Terminal Knee Extension with Resistance  - 2 x daily - 7 x weekly - 1-2 sets - 10 reps - 2 hold  - Hip Abduction with Resistance Loop  - 2 x daily - 7 x weekly - 1-2 sets - 10 reps  - Standing Hip Adduction with Resistance  - 2 x daily - 7 x weekly - 1-2 sets - 10 reps  - Hip Extension with Resistance Loop  - 2 x daily - 7 x weekly - 1-2 sets - 10 reps  - Standing Hip Flexion with Resistance Loop  - 2 x daily - 7 x weekly - 1-2 sets - 10 reps  - Band Walks  - 2 x daily - 7 x weekly - 1-2 sets - 10 reps  - Side Stepping with Resistance at Ankles  - 2 x daily - 7 x weekly - 1-2 sets - 10 reps  - Lateral Step Down  - 2 x daily - 7 x weekly - 1-2 sets - 10 reps      Post LEFS Score  Post LEFS Score: 100 % (3/4/2024 12:55 PM)    100 % improvement    Plan: Discharge from PT to HEP.      Patient/Family/Caregiver was advised of these findings, precautions, and treatment options and has agreed to actively participate in planning and for this course of care.    Thank you for your referral. If you have any questions, please contact me at Dept: [phone].    Sincerely,  Electronically signed by therapist: China Ramirez PT      Physician's certification required:  No  Please co-sign or sign and return this letter via fax as soon as possible to [phone].   I certify the need for these services furnished under this plan of treatment and while under my care.    X___________________________________________________ Date____________________    Certification From: 3/4/2024  To:6/2/2024      Charges: Thera ex -45 mins       Total Timed Treatment: 45 min  Total Treatment Time: 45 min

## 2024-03-08 ENCOUNTER — OFFICE VISIT (OUTPATIENT)
Dept: ORTHOPEDICS CLINIC | Facility: CLINIC | Age: 34
End: 2024-03-08

## 2024-03-08 DIAGNOSIS — M94.261 CHONDROMALACIA, KNEE, RIGHT: Primary | ICD-10-CM

## 2024-03-08 DIAGNOSIS — E88.89 HOFFA'S FAT PAD DISEASE (HCC): ICD-10-CM

## 2024-03-08 PROCEDURE — 99214 OFFICE O/P EST MOD 30 MIN: CPT | Performed by: FAMILY MEDICINE

## 2024-03-08 RX ORDER — DICLOFENAC SODIUM 75 MG/1
75 TABLET, DELAYED RELEASE ORAL 2 TIMES DAILY
Qty: 28 TABLET | Refills: 1 | Status: SHIPPED | OUTPATIENT
Start: 2024-03-08

## 2024-03-08 NOTE — PROGRESS NOTES
Sports Medicine Clinic Note     Subjective:    Chief Complaint: Follow-up for right knee.    Interval History: The 33-year-old female returns for a subsequent follow-up regarding her right knee condition. The patient reports resolution in her knee pain and an increase in mobility since adhering to the physical therapy regimen and activity modifications recommended at the last visit. No new incidents of trauma, locking, catching, or neurological symptoms in the affected knee. 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    Objective:    Right Knee Examination:    Inspection:  - Normal gait   - Stable alignment   - No muscle atrophy observed  - No signs of warmth, erythema, or effusion    Palpation:  - No longer has any tenderness over the patella and no tenderness with patellofemoral compression  - No tenderness along the medial and lateral joint lines  - Full range of motion achieved with minimal to no discomfort    Neurovascular:  - Continues to demonstrate SILT S/S/SP/DP/Tib nerve distributions  - Strong and consistent muscle activation in all previously assessed muscle groups  - Maintains 2+ DP & PT pulses, brisk cap refill    Imaging:     No new imaging performed    Assessment:    Clinical resolution of symptoms related ti Chondromalacia Patella and Hoffa's Fat Pad Impingement, right knee    Plan:    Therapy: Continue with HEP focusing on maintaining strength, stability, and mobility of the knee. Emphasized need to continue maintaining a healthy active lifestyle and maintaining strength about the knee to prevent recurrence of symptoms.  Medication: Given the significant improvement and minimal current discomfort, consider tapering off Diclofenac. Continue with Acetaminophen as needed for any occasional pain.  Activity Modification: Encourage gradual increase in daily activities, still avoiding those that previously exacerbated the knee pain. Continue using the knee brace for added support  during more strenuous activities or as deemed necessary by the patient's comfort level but would like to wean off of this completely if possible.    Follow-Up: Follow up as needed if the patient experiences any worsening of symptoms or has concerns.    Foster Luna DO, NADEEN   Primary Care Sports Medicine    Department of Orthopaedic Surgery  [address] [address]   [address] [place] [address]    t: [phone]  f: [phone]      [facility]

## (undated) NOTE — LETTER
To Whom It May Concern:    Julienne Levy is currently under my medical care and she may return to work on 01.29.2024.    Activity is restricted as follows: light duty.    If you require additional information please contact our office.      Foster Luna DO, NADEEN   Primary Care Sports Medicine    Department of Orthopaedic Surgery  [address] [address]   [address] [place] [address]    t: [phone]  f: [phone]      [facility]

## (undated) NOTE — LETTER
To Whom It May Concern:    Julienne Levy is currently under my medical care and she may return to work on 01.26.2024.    Activity is restricted as follows:     - Please allow accommodations to work from home     If you require additional information please contact our office.      Foster Luna DO, CAQSM   Primary Care Sports Medicine    Department of Orthopaedic Surgery  [address] [address]   [address] [place] [address]    t: [phone]  f: [phone]      [facility]

## (undated) NOTE — LETTER
DATE: 03.08.2024  To Whom It May Concern:    Julienne Levy is currently under my medical care and she may return to work on 03.11.2024.    Activity is restricted as follows: none.    If you require additional information please contact our office.      Foster Luna DO, NADEEN   Primary Care Sports Medicine    Department of Orthopaedic Surgery  [address] [address]   [address] [place] [address]    t: [phone]  f: [phone]      [facility]

## (undated) NOTE — LETTER
To Whom It May Concern:    Julienne Levy is currently under my medical care and may not return to work until review of MRI test results. Patient was seen on 01.12.2024      If you require additional information please contact our office.      Foster Luna DO, NADEEN   Primary Care Sports Medicine    Department of Orthopaedic Surgery  [address] [address]   [address] [place] [address]    t: [phone]  f: [phone]      [facility]